# Patient Record
Sex: FEMALE | Race: AMERICAN INDIAN OR ALASKA NATIVE
[De-identification: names, ages, dates, MRNs, and addresses within clinical notes are randomized per-mention and may not be internally consistent; named-entity substitution may affect disease eponyms.]

---

## 2019-08-04 ENCOUNTER — HOSPITAL ENCOUNTER (EMERGENCY)
Dept: HOSPITAL 5 - ED | Age: 44
Discharge: HOME | End: 2019-08-04
Payer: SELF-PAY

## 2019-08-04 VITALS — SYSTOLIC BLOOD PRESSURE: 155 MMHG | DIASTOLIC BLOOD PRESSURE: 87 MMHG
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
## 2019-08-04 DIAGNOSIS — S46.912A: Primary | ICD-10-CM

## 2019-08-04 PROCEDURE — 99283 EMERGENCY DEPT VISIT LOW MDM: CPT

## 2019-08-04 PROCEDURE — 71046 X-RAY EXAM CHEST 2 VIEWS: CPT

## 2019-08-04 NOTE — XRAY REPORT
CHEST 2 VIEWS 



INDICATION / CLINICAL INFORMATION:

chest pain.



COMPARISON: 

None available.



FINDINGS:



SUPPORT DEVICES: None.

HEART / MEDIASTINUM: No significant abnormality. 

LUNGS / PLEURA: No significant pulmonary or pleural abnormality. No pneumothorax. 



ADDITIONAL FINDINGS: No significant additional findings.



IMPRESSION:

1. No acute findings.



Signer Name: Yonathan Philip MD 

Signed: 8/4/2019 2:26 PM

 Workstation Name: [id number]

## 2019-08-04 NOTE — EMERGENCY DEPARTMENT REPORT
ED Upper Extremity Inj HPI





- General


Chief Complaint: Shoulder Injury


Stated Complaint: L SHOULDER/CHEST PAIN


Time Seen by Provider: 08/04/19 13:03


Source: patient


Mode of arrival: Ambulatory


Limitations: No Limitations





- History of Present Illness


Initial Comments: 





This is a 44-year-old female nontoxic, well nourished in appearance, no acute 

signs of distress presents to the ED with c/o of left shoulder pain x1 day.  

Patient stated that she was heavy lifting at work.  Patient denies any trauma.  

Patient denies any numbness, tingling, fever, chills, nausea, vomiting, chest 

pain, shortness of breath, headache, stiff neck.  Patient denies any joint 

swelling or joint redness.  Patient stated has some decreased range of motion.  

Patient denies any allergies or significant past medical history.


-: days(s) (1)


Other Extremity Injury: Shoulder: Left


Severity scale (0 -10): 8


Improves With: immobilization


Worsens With: movement of extremity


Associated Symptoms: denies other symptoms.  denies: weakness, numbness, neck 

pain, suspects foreign body, nausea/vomiting, heard/felt popping sensat





- Related Data


                                  Previous Rx's











 Medication  Instructions  Recorded  Last Taken  Type


 


Acetaminophen/Codeine [Tylenol 1 tab PO Q6H PRN #12 tab 08/04/19 Unknown Rx





/Codeine # 3 tab]    


 


methOCARBAMOL [Robaxin TAB] 500 mg PO BID PRN #12 tab 08/04/19 Unknown Rx











                                    Allergies











Allergy/AdvReac Type Severity Reaction Status Date / Time


 


No Known Allergies Allergy   Unverified 08/04/19 12:58














ED Review of Systems


ROS: 


Stated complaint: L SHOULDER/CHEST PAIN


Other details as noted in HPI





Constitutional: denies: chills, fever


Eyes: denies: eye pain, eye discharge, vision change


ENT: denies: ear pain, throat pain


Respiratory: denies: cough, shortness of breath, wheezing


Cardiovascular: denies: chest pain, palpitations


Endocrine: no symptoms reported


Gastrointestinal: denies: abdominal pain, nausea, diarrhea


Genitourinary: denies: urgency, dysuria, discharge


Musculoskeletal: arthralgia.  denies: back pain, joint swelling


Skin: denies: rash, lesions


Neurological: denies: headache, weakness, paresthesias


Psychiatric: denies: anxiety, depression


Hematological/Lymphatic: denies: easy bleeding, easy bruising





ED Past Medical Hx





- Past Medical History


Previous Medical History?: No





- Surgical History


Additional Surgical History: c-sections





- Social History


Smoking Status: Never Smoker


Substance Use Type: None





- Medications


Home Medications: 


                                Home Medications











 Medication  Instructions  Recorded  Confirmed  Last Taken  Type


 


Acetaminophen/Codeine [Tylenol 1 tab PO Q6H PRN #12 tab 08/04/19  Unknown Rx





/Codeine # 3 tab]     


 


methOCARBAMOL [Robaxin TAB] 500 mg PO BID PRN #12 tab 08/04/19  Unknown Rx














ED Physical Exam





- General


Limitations: No Limitations


General appearance: alert, in no apparent distress





- Head


Head exam: Present: atraumatic, normocephalic





- Neck


Neck exam: Present: normal inspection, full ROM.  Absent: tenderness, 

meningismus, lymphadenopathy





- Extremities Exam


Extremities exam: Present: normal inspection, full ROM, tenderness, normal 

capillary refill.  Absent: joint swelling





- Expanded Upper Extremity Exam


  ** Left


General: Present: normal inspection


Shoulder Exam: Present: normal inspection, full ROM, tenderness, tenderness over

AC joint.  Absent: swelling, abrasion, laceration, ecchymosis, deformity, 

crepidus, dislocation, erythema


Upper Arm exam: Present: normal inspection, full ROM.  Absent: tenderness, 

swelling


Elbow exam: Present: normal inspection, full ROM.  Absent: tenderness, swelling


Forearm Wrist exam: Present: normal inspection, full ROM.  Absent: tenderness, 

swelling


Hand Wrist exam: Present: normal inspection, full ROM.  Absent: tenderness


Vascular: Present: vascular compromise, normal capillary refill





- Back Exam


Back exam: Present: normal inspection, full ROM.  Absent: tenderness, CVA 

tenderness (R), CVA tenderness (L), muscle spasm, paraspinal tenderness, 

vertebral tenderness, rash noted





- Neurological Exam


Neurological exam: Present: alert, oriented X3, normal gait





- Psychiatric


Psychiatric exam: Present: normal affect, normal mood





- Skin


Skin exam: Present: warm, dry, intact, normal color.  Absent: rash





ED Course





                                   Vital Signs











  08/04/19





  13:04


 


Temperature 98 F


 


Pulse Rate 92 H


 


Respiratory 18





Rate 


 


Blood Pressure 155/87


 


O2 Sat by Pulse 100





Oximetry 














- Reevaluation(s)


Reevaluation #1: 





08/04/19 14:50


Patient is speaking in full sentences with no signs of distress noted.





ED Medical Decision Making





- Medical Decision Making





This is a 44-year-old female that presents with left shulder strain.  Patient is

stable and was examined by me.  I referred patient to an orthopedic doctor for 

further evaluation for possible MRI.  X-ray has been obtained and dictated by 

the radiologist.  Patient is notified of the x-ray report with noted by the 

patient.  Patient does have normal gait with no tenderness and no joint 

swelling.  No ecchymosis. no joint redness or swelling. Not warm to touch. No 

signs of cellulites present. Patient received shoulder immobilizer.  Patient was

instructed to RICE therapy.  Patient received [place] in the ED and stated 

patients family member will drive the patient home after discharge due to 

possible drowsiness.  Patient discharged with Robaxin and Tylenol with Codeine. 

At time of discharge, the patient does not seem toxic or ill in appearance.  No 

acute signs of distress noted.  Patient agrees to discharge treatment plan of 

care.  No further questions noted by the patient.


Critical care attestation.: 


If time is entered above; I have spent that time in minutes in the direct care 

of this critically ill patient, excluding procedure time.








ED Disposition


Clinical Impression: 


Left shoulder strain


Qualifiers:


 Encounter type: initial encounter Qualified Code(s): S46.912A - Strain of 

unspecified muscle, fascia and tendon at shoulder and upper arm level, left arm,

initial encounter





Disposition: DC-01 TO HOME OR SELFCARE


Is pt being admited?: No


Does the pt Need Aspirin: No


Condition: Stable


Instructions:  Rotator Cuff Injury (ED), RICE Therapy (ED), 

Acetaminophen/Codeine (By mouth), Methocarbamol (By mouth)


Additional Instructions: 


Follow-up with a orthopedic doctor in 3-5 days or if symptoms worsen and 

continue return to emergency room as soon as possible. 


Do not operate any machinery while taking Tylenol with codeine and/or Robaxin as

this may cause drowsiness.


No physical activity that extremity until cleared by orthopedic doctor


Prescriptions: 


methOCARBAMOL [Robaxin TAB] 500 mg PO BID PRN #12 tab


 PRN Reason: Muscle Spasm


Acetaminophen/Codeine [Tylenol /Codeine # 3 tab] 1 tab PO Q6H PRN #12 tab


 PRN Reason: Pain , Severe (7-10)


Referrals: 


[facility],MD [Referring] - 3-5 Days


HILARIA DEL TORO MD [Staff Physician] - 3-5 Days


[facility] [Outside] - 3-5 Days


[facility] [Outside] - 3-5 Days


Forms:  Work/School Release Form(ED)

## 2019-08-04 NOTE — EVENT NOTE
ED Screening Note


Date of service: 08/04/19


Time: 13:03


ED Screening Note: 





This is a 44 y.o. F. that presents to the ER with chest pain and left shoulder 

pain for 1 day.





Patient think she pulled a muscle while at work.





Reports a ripping and throbbing sensation that is worse with movement.





LMP 2017





This initial assessment/diagnostic orders/clinical plan/treatment(s) is/are 

subject to change based on patients health status, clinical progression and re-

assessment by fellow clinical providers in the ED. Further treatment and workup 

at subsequent clinical providers discretion. Patient/guardian urged not to elope

from the ED as their condition may be serious if not clinically assessed and 

managed. 





Initial orders include: 





XR left shoulder and chest